# Patient Record
Sex: MALE | Race: WHITE | Employment: STUDENT | URBAN - NONMETROPOLITAN AREA
[De-identification: names, ages, dates, MRNs, and addresses within clinical notes are randomized per-mention and may not be internally consistent; named-entity substitution may affect disease eponyms.]

---
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
## 2023-03-09 ENCOUNTER — HOSPITAL ENCOUNTER (EMERGENCY)
Age: 18
Discharge: HOME | End: 2023-03-10
Payer: COMMERCIAL

## 2023-03-09 VITALS
OXYGEN SATURATION: 97 % | BODY MASS INDEX: 24.25 KG/M2 | HEIGHT: 68 IN | RESPIRATION RATE: 18 BRPM | HEART RATE: 85 BPM | DIASTOLIC BLOOD PRESSURE: 85 MMHG | TEMPERATURE: 98.4 F | WEIGHT: 160 LBS | SYSTOLIC BLOOD PRESSURE: 123 MMHG

## 2023-03-09 DIAGNOSIS — T65.91XA INGESTION OF NONTOXIC SUBSTANCE, ACCIDENTAL OR UNINTENTIONAL, INITIAL ENCOUNTER: Primary | ICD-10-CM

## 2023-03-09 PROCEDURE — 99282 EMERGENCY DEPT VISIT SF MDM: CPT

## 2023-03-09 ASSESSMENT — PAIN - FUNCTIONAL ASSESSMENT: PAIN_FUNCTIONAL_ASSESSMENT: NONE - DENIES PAIN

## 2023-03-10 NOTE — DISCHARGE INSTRUCTIONS
Drink plenty of fluid. He can take some MiraLAX for 1 to move things along. Watch her stool for the piece of glass. If you note a little bit of bleeding that is okay but if you note a heavy amount of bleeding or constant bleeding you need to return.
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
## 2024-09-30 ENCOUNTER — HOSPITAL ENCOUNTER (EMERGENCY)
Age: 19
Discharge: HOME | End: 2024-09-30
Payer: COMMERCIAL

## 2024-09-30 VITALS
TEMPERATURE: 100.6 F | OXYGEN SATURATION: 100 % | BODY MASS INDEX: 25.09 KG/M2 | DIASTOLIC BLOOD PRESSURE: 95 MMHG | SYSTOLIC BLOOD PRESSURE: 151 MMHG | HEART RATE: 65 BPM | RESPIRATION RATE: 18 BRPM | WEIGHT: 165 LBS

## 2024-09-30 DIAGNOSIS — B34.9 VIRAL ILLNESS: Primary | ICD-10-CM

## 2024-09-30 LAB — SARS-COV-2 RDRP RESP QL NAA+PROBE: NOT  DETECTED

## 2024-09-30 PROCEDURE — 87635 SARS-COV-2 COVID-19 AMP PRB: CPT

## 2024-09-30 PROCEDURE — 99213 OFFICE O/P EST LOW 20 MIN: CPT

## 2024-09-30 PROCEDURE — 6370000000 HC RX 637 (ALT 250 FOR IP)

## 2024-09-30 RX ORDER — ONDANSETRON 4 MG/1
4 TABLET, ORALLY DISINTEGRATING ORAL ONCE
Status: COMPLETED | OUTPATIENT
Start: 2024-09-30 | End: 2024-09-30

## 2024-09-30 RX ORDER — ONDANSETRON 4 MG/1
4 TABLET, ORALLY DISINTEGRATING ORAL 3 TIMES DAILY PRN
Qty: 21 TABLET | Refills: 0 | Status: SHIPPED | OUTPATIENT
Start: 2024-09-30

## 2024-09-30 RX ORDER — ACETAMINOPHEN AND CHLORPHENIRAMINE MALEATE 325; 2 MG/1; MG/1
2 TABLET, FILM COATED ORAL EVERY 4 HOURS PRN
Qty: 80 TABLET | Refills: 0 | Status: SHIPPED | OUTPATIENT
Start: 2024-09-30 | End: 2024-10-07

## 2024-09-30 RX ADMIN — ONDANSETRON 4 MG: 4 TABLET, ORALLY DISINTEGRATING ORAL at 12:58

## 2024-09-30 ASSESSMENT — ENCOUNTER SYMPTOMS
ABDOMINAL PAIN: 0
SHORTNESS OF BREATH: 0
NAUSEA: 1

## 2024-09-30 ASSESSMENT — PAIN - FUNCTIONAL ASSESSMENT: PAIN_FUNCTIONAL_ASSESSMENT: NONE - DENIES PAIN

## 2024-09-30 NOTE — ED PROVIDER NOTES
[facility] URGENT CARE  Urgent Care Encounter      CHIEF COMPLAINT       Chief Complaint   Patient presents with    Nausea    Illness       Nurses Notes reviewed and I agree except as noted in the HPI.  HISTORY OF PRESENT ILLNESS   Bari Beyer is a 19 y.o. male who presents to urgent care with complaints of nausea, fever, headache.  Patient reports symptoms started 1 to 2 days ago.  Reports he has been taking Advil for his symptoms.  Denies being exposed to anyone sick recently that he is aware of.  Denies abdominal pain, sore throat, cough.    REVIEW OF SYSTEMS     Review of Systems   Constitutional:  Positive for fever. Negative for chills.   Respiratory:  Negative for shortness of breath.    Cardiovascular:  Negative for chest pain.   Gastrointestinal:  Positive for nausea. Negative for abdominal pain.   Neurological:  Positive for headaches. Negative for seizures.       PAST MEDICAL HISTORY   History reviewed. No pertinent past medical history.    SURGICAL HISTORY     Patient  has no past surgical history on file.    CURRENT MEDICATIONS       Discharge Medication List as of 9/30/2024  1:21 PM          ALLERGIES     Patient is has No Known Allergies.    FAMILY HISTORY     Patient'sfamily history is not on file.    SOCIAL HISTORY     Patient  reports that he has been smoking e-cigarettes. He has never used smokeless tobacco. He reports current alcohol use. He reports that he does not use drugs.    PHYSICAL EXAM     ED TRIAGE VITALS  BP: (!) 151/95, Temp: (!) 100.6 °F (38.1 °C), Pulse: 65, Respirations: 18, SpO2: 100 %  Physical Exam  Vitals and nursing note reviewed.   Constitutional:       General: He is not in acute distress.     Appearance: Normal appearance. He is not diaphoretic.   HENT:      Head: Normocephalic and atraumatic.      Nose: Congestion present.      Mouth/Throat:      Mouth: Mucous membranes are moist.   Cardiovascular:      Rate and Rhythm: Normal rate.   Pulmonary:      Effort:  Pulmonary effort is normal.      Breath sounds: Normal breath sounds.   Skin:     General: Skin is warm and dry.      Capillary Refill: Capillary refill takes less than 2 seconds.   Neurological:      General: No focal deficit present.      Mental Status: He is alert and oriented to person, place, and time. Mental status is at baseline.         DIAGNOSTIC RESULTS   Labs:  Results for orders placed or performed during the hospital encounter of 09/30/24   COVID-19, Rapid    Specimen: Nasopharyngeal Swab   Result Value Ref Range    SARS-CoV-2, DANIE NOT  DETECTED NOT DETECTED       IMAGING:  No orders to display      URGENT CARE COURSE:     Vitals:    09/30/24 1252   BP: (!) 151/95   Pulse: 65   Resp: 18   Temp: (!) 100.6 °F (38.1 °C)   TempSrc: Oral   SpO2: 100%   Weight: 74.8 kg (165 lb)       Medications   ondansetron (ZOFRAN-ODT) disintegrating tablet 4 mg (4 mg Oral Given 9/30/24 1258)     PROCEDURES:  None  FINAL IMPRESSION      1. Viral illness        DISPOSITION/PLAN   DISPOSITION Decision To Discharge 09/30/2024 01:19:05 PM  Condition at Disposition: Data Unavailable    Patient is negative for COVID-19 today.  Patient given oral Zofran in the urgent care due to nausea.  Discussed with patient plan to treat him with Coricidin at home and Zofran for nausea.  Discussed with patient to encourage hydration and rest.  If symptoms persist and do not improve over the next 5 to 7 days, follow-up with your primary care provider.  School work note provided.  Patient in agreement with this plan.    PATIENT REFERRED TO:  [facility]  [address]  [address] [place] [id number] [phone]  Schedule an appointment as soon as possible for a visit   As needed    [facility] EMERGENCY DEPT  [address] [address]  [phone]  Go to   Go directly to [facility] ER, If symptoms worsen    DISCHARGE MEDICATIONS:  Discharge Medication List as of 9/30/2024  1:21 PM        START taking these

## 2024-09-30 NOTE — ED NOTES
Pt ambulatory to [facility] with c/o nausea, lightheadedness, and generalized illness feeling. Pt reports symptoms started x1-2 days ago. Reports taking Advil at home with no relief. Pt swabbed for covid. No other concerns.      Khushboo Ospina RN  09/30/24 5900

## 2024-11-03 ENCOUNTER — APPOINTMENT (OUTPATIENT)
Dept: GENERAL RADIOLOGY | Age: 19
End: 2024-11-03
Payer: COMMERCIAL

## 2024-11-03 ENCOUNTER — HOSPITAL ENCOUNTER (EMERGENCY)
Age: 19
Discharge: HOME | End: 2024-11-03
Payer: COMMERCIAL

## 2024-11-03 VITALS
HEART RATE: 78 BPM | DIASTOLIC BLOOD PRESSURE: 86 MMHG | TEMPERATURE: 98 F | WEIGHT: 165 LBS | SYSTOLIC BLOOD PRESSURE: 146 MMHG | BODY MASS INDEX: 25.01 KG/M2 | OXYGEN SATURATION: 98 % | RESPIRATION RATE: 16 BRPM | HEIGHT: 68 IN

## 2024-11-03 DIAGNOSIS — M25.572 ACUTE LEFT ANKLE PAIN: ICD-10-CM

## 2024-11-03 DIAGNOSIS — S93.402A SPRAIN OF LEFT ANKLE, UNSPECIFIED LIGAMENT, INITIAL ENCOUNTER: Primary | ICD-10-CM

## 2024-11-03 PROCEDURE — 99212 OFFICE O/P EST SF 10 MIN: CPT | Performed by: PEDIATRICS

## 2024-11-03 PROCEDURE — 99213 OFFICE O/P EST LOW 20 MIN: CPT

## 2024-11-03 PROCEDURE — 73610 X-RAY EXAM OF ANKLE: CPT

## 2024-11-03 ASSESSMENT — PAIN DESCRIPTION - ORIENTATION: ORIENTATION: LEFT

## 2024-11-03 ASSESSMENT — PAIN - FUNCTIONAL ASSESSMENT: PAIN_FUNCTIONAL_ASSESSMENT: 0-10

## 2024-11-03 ASSESSMENT — PAIN SCALES - GENERAL: PAINLEVEL_OUTOF10: 6

## 2024-11-03 ASSESSMENT — PAIN DESCRIPTION - LOCATION: LOCATION: ANKLE

## 2024-11-03 ASSESSMENT — PAIN DESCRIPTION - PAIN TYPE: TYPE: ACUTE PAIN

## 2024-11-03 ASSESSMENT — PAIN DESCRIPTION - DESCRIPTORS: DESCRIPTORS: SHARP

## 2024-11-03 NOTE — ED PROVIDER NOTES
[facility] URGENT CARE  Urgent Care Encounter       CHIEF COMPLAINT       Chief Complaint   Patient presents with    Ankle Injury     Left ankle pain onset last night        Nurses Notes reviewed and I agree except as noted in the HPI.  HISTORY OF PRESENT ILLNESS   Bari Beyer is a 19 y.o. male who presents with 1 day history of left ankle pain and swelling after hurting it during a wrestling match last evening with his body.  Patient unsure what happened to his ankle, states we were wrestling and I think his body weight fell on my ankle, or I must of fell and twisted it while I was falling.  Patient reports he can walk on the ankle with some difficulty.  Patient reports he has taken Motrin, states this has helped with some of the pain.    The history is provided by the patient. No  was used.       REVIEW OF SYSTEMS     Review of Systems   Constitutional: Negative.    HENT: Negative.     Eyes: Negative.    Respiratory: Negative.     Cardiovascular: Negative.    Endocrine: Negative.    Genitourinary: Negative.    Musculoskeletal:  Positive for gait problem (due to right ankle pain and swelling).   Skin: Negative.    Allergic/Immunologic: Negative.    Hematological: Negative.    Psychiatric/Behavioral: Negative.         PAST MEDICAL HISTORY   History reviewed. No pertinent past medical history.    SURGICALHISTORY     Patient  has no past surgical history on file.    CURRENT MEDICATIONS       Current Discharge Medication List        CONTINUE these medications which have NOT CHANGED    Details   ondansetron (ZOFRAN-ODT) 4 MG disintegrating tablet Take 1 tablet by mouth 3 times daily as needed for Nausea or Vomiting  Qty: 21 tablet, Refills: 0             ALLERGIES     Patient is has No Known Allergies.    Patients   Immunization History   Administered Date(s) Administered    COVID-19, PFIZER PURPLE top, DILUTE for use, (age 12 y+), 30mcg/0.3mL 11/05/2021       FAMILY HISTORY      Patient's family history is not on file.    SOCIAL HISTORY     Patient  reports that he has been smoking e-cigarettes. He has never used smokeless tobacco. He reports current alcohol use. He reports that he does not use drugs.    PHYSICAL EXAM     ED TRIAGE VITALS  BP: (!) 146/86, Temp: 98 °F (36.7 °C), Pulse: 78, Respirations: 16, SpO2: 98 %,Estimated body mass index is 25.09 kg/m² as calculated from the following:    Height as of this encounter: 1.727 m (5' 8\").    Weight as of this encounter: 74.8 kg (165 lb).,No LMP for male patient.    Physical Exam  Vitals and nursing note reviewed.   Constitutional:       Appearance: Normal appearance. He is normal weight.   HENT:      Head: Normocephalic and atraumatic.      Right Ear: Tympanic membrane normal.      Left Ear: Tympanic membrane normal.      Nose: Nose normal.      Mouth/Throat:      Mouth: Mucous membranes are moist.      Pharynx: Oropharynx is clear.   Eyes:      Extraocular Movements: Extraocular movements intact.      Conjunctiva/sclera: Conjunctivae normal.      Pupils: Pupils are equal, round, and reactive to light.   Cardiovascular:      Rate and Rhythm: Normal rate and regular rhythm.      Pulses: Normal pulses.      Heart sounds: Normal heart sounds.   Pulmonary:      Effort: Pulmonary effort is normal.      Breath sounds: Normal breath sounds.   Abdominal:      General: Abdomen is flat. Bowel sounds are normal.      Palpations: Abdomen is soft.   Musculoskeletal:         General: Swelling (moderate swelling left lateral malleolus, mild swelling medial malleolus full rom) present. Normal range of motion.      Cervical back: Normal range of motion.   Skin:     General: Skin is warm and dry.   Neurological:      General: No focal deficit present.      Mental Status: He is alert and oriented to person, place, and time. Mental status is at baseline.   Psychiatric:         Mood and Affect: Mood normal.         Behavior: Behavior normal.

## 2024-11-03 NOTE — DISCHARGE INSTR - COC
Continuity of Care Form    Patient Name: Bari Beyer   :  2005  MRN:  [id number]    Admit date:  11/3/2024  Discharge date:  ***    Code Status Order: No Order   Advance Directives:   Advance Care Flowsheet Documentation             Admitting Physician:  No admitting provider for patient encounter.  PCP: No primary care provider on file.    Discharging Nurse: ***  Discharging Hospital Unit/Room#:   Discharging Unit Phone Number: ***    Emergency Contact:   Extended Emergency Contact Information  Primary Emergency Contact: JENNIFERMONICO  Address: [address] [place] [address]  Home Phone: [phone]  Mobile Phone: [phone]  Relation: Parent  Preferred language: English    Past Surgical History:  History reviewed. No pertinent surgical history.    Immunization History:   Immunization History   Administered Date(s) Administered    COVID-19, PFIZER PURPLE top, DILUTE for use, (age 12 y+), 30mcg/0.3mL 2021       Active Problems:  There is no problem list on file for this patient.      Isolation/Infection:   Isolation            No Isolation          Patient Infection Status       None to display                     Nurse Assessment:  Last Vital Signs: BP (!) 146/86   Pulse 78   Temp 98 °F (36.7 °C) (Temporal)   Resp 16   Ht 1.727 m (5' 8\")   Wt 74.8 kg (165 lb)   SpO2 98%   BMI 25.09 kg/m²     Last documented pain score (0-10 scale): Pain Level: 6  Last Weight:   Wt Readings from Last 1 Encounters:   24 74.8 kg (165 lb)     Mental Status:  {IP PT MENTAL STATUS:}    IV Access:  { JOHN IV ACCESS:250450558}    Nursing Mobility/ADLs:  Walking   {CHP DME ADLs:214432614}  Transfer  {CHP DME ADLs:811504320}  Bathing  {CHP DME ADLs:456978721}  Dressing  {CHP DME ADLs:488254485}  Toileting  {CHP DME ADLs:578032727}  Feeding  {CHP DME ADLs:748710325}  Med Admin  {CHP DME ADLs:923915605}  Med Delivery   { JOHN MED Delivery:334925919}    Wound Care  Documentation and Therapy:        Elimination:  Continence:   Bowel: {YES / NO:}  Bladder: {YES / NO:}  Urinary Catheter: {Urinary Catheter:617200423}   Colostomy/Ileostomy/Ileal Conduit: {YES / NO:}       Date of Last BM: ***  No intake or output data in the 24 hours ending 24 1217  No intake/output data recorded.    Safety Concerns:     { JOHN Safety Concerns:515735913}    Impairments/Disabilities:      { JOHN Impairments/Disabilities:795570975}    Nutrition Therapy:  Current Nutrition Therapy:   { JOHN Diet List:061050750}    Routes of Feeding: {[facility] DME Other Feedings:753840475}  Liquids: {Slp liquid thickness:73103}  Daily Fluid Restriction: {[facility] DME Yes amt example:114021561}  Last Modified Barium Swallow with Video (Video Swallowing Test): {Done Not Done Date:620819241}    Treatments at the Time of Hospital Discharge:   Respiratory Treatments: ***  Oxygen Therapy:  {Therapy; copd oxygen:11773}  Ventilator:    {[facility] Vent List:794019997}    Rehab Therapies: {THERAPEUTIC INTERVENTION:6853138948}  Weight Bearing Status/Restrictions: {[facility] Weight Bearin}  Other Medical Equipment (for information only, NOT a DME order):  {EQUIPMENT:044276329}  Other Treatments: ***    Patient's personal belongings (please select all that are sent with patient):  {[facility] DME Belongings:680017065}    RN SIGNATURE:  {Esignature:548311300}    CASE MANAGEMENT/SOCIAL WORK SECTION    Inpatient Status Date: ***    Readmission Risk Assessment Score:  Readmission Risk              Risk of Unplanned Readmission:  0           Discharging to Facility/ Agency   Name:   Address:  Phone:  Fax:    Dialysis Facility (if applicable)   Name:  Address:  Dialysis Schedule:  Phone:  Fax:    / signature: {Esignature:216364469}    PHYSICIAN SECTION    Prognosis: {Prognosis:5364503720}    Condition at Discharge: { Patient Condition:328359351}    Rehab Potential (if transferring to Rehab):

## 2024-11-10 ENCOUNTER — HOSPITAL ENCOUNTER (EMERGENCY)
Age: 19
Discharge: HOME | End: 2024-11-10
Payer: COMMERCIAL

## 2024-11-10 VITALS
HEART RATE: 81 BPM | WEIGHT: 165 LBS | HEIGHT: 68 IN | RESPIRATION RATE: 16 BRPM | OXYGEN SATURATION: 98 % | SYSTOLIC BLOOD PRESSURE: 137 MMHG | TEMPERATURE: 97 F | BODY MASS INDEX: 25.01 KG/M2 | DIASTOLIC BLOOD PRESSURE: 84 MMHG

## 2024-11-10 DIAGNOSIS — S93.402A SPRAIN OF LEFT ANKLE, UNSPECIFIED LIGAMENT, INITIAL ENCOUNTER: Primary | ICD-10-CM

## 2024-11-10 PROCEDURE — 99213 OFFICE O/P EST LOW 20 MIN: CPT

## 2024-11-10 ASSESSMENT — PAIN DESCRIPTION - LOCATION: LOCATION: ANKLE

## 2024-11-10 ASSESSMENT — PAIN - FUNCTIONAL ASSESSMENT: PAIN_FUNCTIONAL_ASSESSMENT: 0-10

## 2024-11-10 ASSESSMENT — PAIN SCALES - GENERAL: PAINLEVEL_OUTOF10: 2

## 2024-11-10 ASSESSMENT — PAIN DESCRIPTION - ORIENTATION: ORIENTATION: LEFT

## 2024-11-10 ASSESSMENT — PAIN DESCRIPTION - PAIN TYPE: TYPE: ACUTE PAIN

## 2024-11-10 ASSESSMENT — PAIN DESCRIPTION - DESCRIPTORS: DESCRIPTORS: ACHING;DULL

## 2024-11-10 NOTE — DISCHARGE INSTRUCTIONS
Can take over-the-counter Motrin as needed for pain.  Wear Ace wrap, ice, elevate as needed.  Follow-up with orthopedic Helena call for an appointment.  Go to emergency room for loss of function or any new or worsening symptoms.

## 2024-11-10 NOTE — ED TRIAGE NOTES
Arrives to [facility] for the evaluation of left ankle recheck.  Was seen on 11/3/24 and Dx with Left ankle sprain.  Continues to have pain in the ankle and bruising.  Discarded the ACE wrap and has been wearing a brace instead.  Does not have on at this time.  Reports having pain in the left ankle rated 2/10 in severity.  Denies taking any OTC pain reliever.  Has elevated and iced.  VSS.  Waiting provider to assess.

## 2024-11-10 NOTE — ED PROVIDER NOTES
[facility] URGENT CARE  Urgent Care Encounter       CHIEF COMPLAINT       Chief Complaint   Patient presents with    Ankle Pain     Left       Nurses Notes reviewed and I agree except as noted in the HPI.  HISTORY OF PRESENT ILLNESS   Bari Beyer is a 19 y.o. male who presents to urgent care for left ankle pain.  States he was seen on 11/3/2024 was diagnosed with a sprain.  Denies follow-up with orthopedic Geraldine.  States he bought an over-the-counter ankle wrap that increased his pain.  Did not reuse his Ace wrap due to not knowing how to put it on.  Denies over-the-counter medications.    The history is provided by the patient. No  was used.       REVIEW OF SYSTEMS     Review of Systems   Constitutional: Negative.    HENT: Negative.     Eyes: Negative.    Respiratory: Negative.     Cardiovascular: Negative.    Gastrointestinal: Negative.    Endocrine: Negative.    Genitourinary: Negative.    Musculoskeletal:  Positive for arthralgias (left ankle).   Skin: Negative.    Allergic/Immunologic: Negative.    Neurological: Negative.    Hematological: Negative.    Psychiatric/Behavioral: Negative.         PAST MEDICAL HISTORY   History reviewed. No pertinent past medical history.    SURGICALHISTORY     Patient  has no past surgical history on file.    CURRENT MEDICATIONS       Discharge Medication List as of 11/10/2024  5:25 PM        CONTINUE these medications which have NOT CHANGED    Details   ondansetron (ZOFRAN-ODT) 4 MG disintegrating tablet Take 1 tablet by mouth 3 times daily as needed for Nausea or Vomiting, Disp-21 tablet, R-0Print             ALLERGIES     Patient is has No Known Allergies.    Patients   Immunization History   Administered Date(s) Administered    COVID-19, PFIZER PURPLE top, DILUTE for use, (age 12 y+), 30mcg/0.3mL 11/05/2021       FAMILY HISTORY     Patient's family history is not on file.    SOCIAL HISTORY     Patient  reports that he has been smoking